# Patient Record
Sex: MALE | Race: WHITE | URBAN - METROPOLITAN AREA
[De-identification: names, ages, dates, MRNs, and addresses within clinical notes are randomized per-mention and may not be internally consistent; named-entity substitution may affect disease eponyms.]

---

## 2017-07-25 ENCOUNTER — APPOINTMENT (RX ONLY)
Dept: URBAN - METROPOLITAN AREA OTHER 6 | Facility: OTHER | Age: 62
Setting detail: DERMATOLOGY
End: 2017-07-25

## 2017-07-25 DIAGNOSIS — L73.8 OTHER SPECIFIED FOLLICULAR DISORDERS: ICD-10-CM

## 2017-07-25 DIAGNOSIS — D485 NEOPLASM OF UNCERTAIN BEHAVIOR OF SKIN: ICD-10-CM

## 2017-07-25 DIAGNOSIS — L21.8 OTHER SEBORRHEIC DERMATITIS: ICD-10-CM

## 2017-07-25 PROBLEM — I10 ESSENTIAL (PRIMARY) HYPERTENSION: Status: ACTIVE | Noted: 2017-07-25

## 2017-07-25 PROBLEM — E13.9 OTHER SPECIFIED DIABETES MELLITUS WITHOUT COMPLICATIONS: Status: ACTIVE | Noted: 2017-07-25

## 2017-07-25 PROBLEM — E78.5 HYPERLIPIDEMIA, UNSPECIFIED: Status: ACTIVE | Noted: 2017-07-25

## 2017-07-25 PROBLEM — D48.5 NEOPLASM OF UNCERTAIN BEHAVIOR OF SKIN: Status: ACTIVE | Noted: 2017-07-25

## 2017-07-25 PROCEDURE — ? OTHER

## 2017-07-25 PROCEDURE — 11100: CPT

## 2017-07-25 PROCEDURE — ? COUNSELING

## 2017-07-25 PROCEDURE — ? ADDITIONAL NOTES

## 2017-07-25 PROCEDURE — 99202 OFFICE O/P NEW SF 15 MIN: CPT | Mod: 25

## 2017-07-25 PROCEDURE — ? BIOPSY BY SHAVE METHOD

## 2017-07-25 ASSESSMENT — LOCATION ZONE DERM
LOCATION ZONE: FACE
LOCATION ZONE: FACE

## 2017-07-25 ASSESSMENT — LOCATION DETAILED DESCRIPTION DERM
LOCATION DETAILED: LEFT CENTRAL MALAR CHEEK
LOCATION DETAILED: RIGHT CENTRAL MALAR CHEEK
LOCATION DETAILED: LEFT INFERIOR MEDIAL FOREHEAD
LOCATION DETAILED: SUPERIOR MID FOREHEAD

## 2017-07-25 ASSESSMENT — LOCATION SIMPLE DESCRIPTION DERM
LOCATION SIMPLE: LEFT FOREHEAD
LOCATION SIMPLE: RIGHT CHEEK
LOCATION SIMPLE: SUPERIOR FOREHEAD
LOCATION SIMPLE: LEFT CHEEK

## 2017-07-25 NOTE — PROCEDURE: BIOPSY BY SHAVE METHOD
Silver Nitrate Text: The wound bed was treated with silver nitrate after the biopsy was performed.
Biopsy Method: 15 blade
Detail Level: Detailed
Bill For Surgical Tray: no
Wound Care: Bactroban
Anesthesia Volume In Cc: 0.5
Size Of Lesion In Cm: 0
Body Location Override (Optional - Billing Will Still Be Based On Selected Body Map Location If Applicable): mid forehead
Electrodesiccation Text: The wound bed was treated with electrodesiccation after the biopsy was performed.
Anesthesia Type: 1% lidocaine without epinephrine
Dressing: bandage
Curettage Text: The wound bed was treated with curettage after the biopsy was performed.
Notification Instructions: Patient will be notified of biopsy results. However, patient instructed to call the office if not contacted within 2 weeks.
Destruction After The Procedure: Yes
Consent: Written consent was obtained and risks were reviewed including but not limited to scarring, infection, bleeding, scabbing, incomplete removal, nerve damage and allergy to anesthesia.
Post-Care Instructions: I reviewed with the patient in detail post-care instructions. Patient is to keep the biopsy site dry overnight, and then apply bacitracin twice daily until healed. Patient may apply hydrogen peroxide soaks to remove any crusting.
Billing Type: Third-Party Bill
Biopsy Type: H and E
Hemostasis: Aluminum Chloride and Electrocautery
Electrodesiccation And Curettage Text: The wound bed was treated with electrodesiccation and curettage after the biopsy was performed.
Cryotherapy Text: The wound bed was treated with cryotherapy after the biopsy was performed.
Type Of Destruction Used: Curettage

## 2017-07-25 NOTE — PROCEDURE: OTHER
Detail Level: Zone
Note Text (......Xxx Chief Complaint.): This diagnosis correlates with the
Other (Free Text): Dr. Purdy OTC 1% HC Cream qd to face and Nizoral shampoo qd for Seborrheic dermatitis

## 2023-06-24 ENCOUNTER — OUT OF OFFICE VISIT (OUTPATIENT)
Dept: URBAN - METROPOLITAN AREA MEDICAL CENTER 24 | Facility: MEDICAL CENTER | Age: 68
End: 2023-06-24
Payer: COMMERCIAL

## 2023-06-24 DIAGNOSIS — C25.0 ADENOCARCINOMA OF HEAD OF PANCREAS: ICD-10-CM

## 2023-06-24 PROCEDURE — 43242 EGD US FINE NEEDLE BX/ASPIR: CPT | Performed by: INTERNAL MEDICINE

## 2023-09-24 ENCOUNTER — OUT OF OFFICE VISIT (OUTPATIENT)
Dept: URBAN - METROPOLITAN AREA MEDICAL CENTER 25 | Facility: MEDICAL CENTER | Age: 68
End: 2023-09-24
Payer: COMMERCIAL

## 2023-09-24 DIAGNOSIS — I48.91 A-FIB: ICD-10-CM

## 2023-09-24 DIAGNOSIS — R17 CHOLESTATIC JAUNDICE: ICD-10-CM

## 2023-09-24 DIAGNOSIS — C25.9 ADENOCARCINOMA OF PANCREAS: ICD-10-CM

## 2023-09-24 PROCEDURE — 99255 IP/OBS CONSLTJ NEW/EST HI 80: CPT | Performed by: INTERNAL MEDICINE

## 2023-09-25 ENCOUNTER — OUT OF OFFICE VISIT (OUTPATIENT)
Dept: URBAN - METROPOLITAN AREA MEDICAL CENTER 25 | Facility: MEDICAL CENTER | Age: 68
End: 2023-09-25
Payer: COMMERCIAL

## 2023-09-25 DIAGNOSIS — I48.91 A-FIB: ICD-10-CM

## 2023-09-25 DIAGNOSIS — K83.1 AMPULLA OF VATER OBSTRUCTION SYNDROME: ICD-10-CM

## 2023-09-25 DIAGNOSIS — K86.89 ACUTE PANCREATIC FLUID COLLECTION: ICD-10-CM

## 2023-09-25 PROCEDURE — 99232 SBSQ HOSP IP/OBS MODERATE 35: CPT | Performed by: INTERNAL MEDICINE

## 2023-09-26 ENCOUNTER — OUT OF OFFICE VISIT (OUTPATIENT)
Dept: URBAN - METROPOLITAN AREA MEDICAL CENTER 25 | Facility: MEDICAL CENTER | Age: 68
End: 2023-09-26
Payer: COMMERCIAL

## 2023-09-26 DIAGNOSIS — D64.89 ANEMIA DUE TO OTHER CAUSE: ICD-10-CM

## 2023-09-26 DIAGNOSIS — K83.1 AMPULLA OF VATER OBSTRUCTION SYNDROME: ICD-10-CM

## 2023-09-26 DIAGNOSIS — I48.91 A-FIB: ICD-10-CM

## 2023-09-26 DIAGNOSIS — K86.89 ACUTE PANCREATIC FLUID COLLECTION: ICD-10-CM

## 2023-09-26 PROCEDURE — 99232 SBSQ HOSP IP/OBS MODERATE 35: CPT | Performed by: INTERNAL MEDICINE

## 2023-09-27 ENCOUNTER — OUT OF OFFICE VISIT (OUTPATIENT)
Dept: URBAN - METROPOLITAN AREA MEDICAL CENTER 25 | Facility: MEDICAL CENTER | Age: 68
End: 2023-09-27
Payer: COMMERCIAL

## 2023-09-27 DIAGNOSIS — K86.89 ACUTE PANCREATIC FLUID COLLECTION: ICD-10-CM

## 2023-09-27 DIAGNOSIS — R17 CHOLESTATIC JAUNDICE: ICD-10-CM

## 2023-09-27 DIAGNOSIS — K83.8 ACQUIRED DILATION OF BILE DUCT: ICD-10-CM

## 2023-09-27 PROCEDURE — 43274 ERCP DUCT STENT PLACEMENT: CPT | Performed by: INTERNAL MEDICINE
